# Patient Record
Sex: FEMALE | Race: WHITE | NOT HISPANIC OR LATINO | Employment: OTHER | URBAN - METROPOLITAN AREA
[De-identification: names, ages, dates, MRNs, and addresses within clinical notes are randomized per-mention and may not be internally consistent; named-entity substitution may affect disease eponyms.]

---

## 2022-08-10 ENCOUNTER — APPOINTMENT (OUTPATIENT)
Dept: CT IMAGING | Facility: HOSPITAL | Age: 87
End: 2022-08-10

## 2022-08-10 ENCOUNTER — HOSPITAL ENCOUNTER (EMERGENCY)
Facility: HOSPITAL | Age: 87
Discharge: HOME | End: 2022-08-10
Attending: EMERGENCY MEDICINE | Admitting: EMERGENCY MEDICINE

## 2022-08-10 VITALS
SYSTOLIC BLOOD PRESSURE: 153 MMHG | WEIGHT: 132.5 LBS | DIASTOLIC BLOOD PRESSURE: 69 MMHG | TEMPERATURE: 98.1 F | OXYGEN SATURATION: 93 % | HEART RATE: 106 BPM | BODY MASS INDEX: 22.62 KG/M2 | HEIGHT: 64 IN | RESPIRATION RATE: 18 BRPM

## 2022-08-10 DIAGNOSIS — N30.90 CYSTITIS: ICD-10-CM

## 2022-08-10 DIAGNOSIS — R33.9 URINARY RETENTION: Primary | ICD-10-CM

## 2022-08-10 LAB
ALBUMIN SERPL-MCNC: 4.8 G/DL (ref 3.5–5.2)
ALBUMIN/GLOB SERPL: 1.6 G/DL
ALP SERPL-CCNC: 79 U/L (ref 39–117)
ALT SERPL W P-5'-P-CCNC: 16 U/L (ref 1–33)
ANION GAP SERPL CALCULATED.3IONS-SCNC: 13.7 MMOL/L (ref 5–15)
AST SERPL-CCNC: 16 U/L (ref 1–32)
BACTERIA UR QL AUTO: ABNORMAL /HPF
BASOPHILS # BLD AUTO: 0.04 10*3/MM3 (ref 0–0.2)
BASOPHILS NFR BLD AUTO: 0.7 % (ref 0–1.5)
BILIRUB SERPL-MCNC: 0.4 MG/DL (ref 0–1.2)
BILIRUB UR QL STRIP: NEGATIVE
BUN SERPL-MCNC: 18 MG/DL (ref 8–23)
BUN/CREAT SERPL: 20.2 (ref 7–25)
CALCIUM SPEC-SCNC: 10.1 MG/DL (ref 8.2–9.6)
CHLORIDE SERPL-SCNC: 98 MMOL/L (ref 98–107)
CLARITY UR: CLEAR
CO2 SERPL-SCNC: 22.3 MMOL/L (ref 22–29)
COLOR UR: YELLOW
CREAT SERPL-MCNC: 0.89 MG/DL (ref 0.57–1)
D-LACTATE SERPL-SCNC: 1.3 MMOL/L (ref 0.5–2)
DEPRECATED RDW RBC AUTO: 45.4 FL (ref 37–54)
EGFRCR SERPLBLD CKD-EPI 2021: 59 ML/MIN/1.73
EOSINOPHIL # BLD AUTO: 0.18 10*3/MM3 (ref 0–0.4)
EOSINOPHIL NFR BLD AUTO: 2.9 % (ref 0.3–6.2)
ERYTHROCYTE [DISTWIDTH] IN BLOOD BY AUTOMATED COUNT: 14 % (ref 12.3–15.4)
GLOBULIN UR ELPH-MCNC: 3 GM/DL
GLUCOSE SERPL-MCNC: 240 MG/DL (ref 65–99)
GLUCOSE UR STRIP-MCNC: ABNORMAL MG/DL
HCT VFR BLD AUTO: 41.3 % (ref 34–46.6)
HGB BLD-MCNC: 13.5 G/DL (ref 12–15.9)
HGB UR QL STRIP.AUTO: ABNORMAL
HOLD SPECIMEN: NORMAL
HOLD SPECIMEN: NORMAL
HYALINE CASTS UR QL AUTO: ABNORMAL /LPF
IMM GRANULOCYTES # BLD AUTO: 0.06 10*3/MM3 (ref 0–0.05)
IMM GRANULOCYTES NFR BLD AUTO: 1 % (ref 0–0.5)
KETONES UR QL STRIP: ABNORMAL
LEUKOCYTE ESTERASE UR QL STRIP.AUTO: ABNORMAL
LIPASE SERPL-CCNC: 36 U/L (ref 13–60)
LYMPHOCYTES # BLD AUTO: 2.51 10*3/MM3 (ref 0.7–3.1)
LYMPHOCYTES NFR BLD AUTO: 41 % (ref 19.6–45.3)
MCH RBC QN AUTO: 29.2 PG (ref 26.6–33)
MCHC RBC AUTO-ENTMCNC: 32.7 G/DL (ref 31.5–35.7)
MCV RBC AUTO: 89.2 FL (ref 79–97)
MONOCYTES # BLD AUTO: 0.46 10*3/MM3 (ref 0.1–0.9)
MONOCYTES NFR BLD AUTO: 7.5 % (ref 5–12)
NEUTROPHILS NFR BLD AUTO: 2.87 10*3/MM3 (ref 1.7–7)
NEUTROPHILS NFR BLD AUTO: 46.9 % (ref 42.7–76)
NITRITE UR QL STRIP: NEGATIVE
NRBC BLD AUTO-RTO: 0 /100 WBC (ref 0–0.2)
PH UR STRIP.AUTO: 7 [PH] (ref 5–8)
PLATELET # BLD AUTO: 252 10*3/MM3 (ref 140–450)
PMV BLD AUTO: 8 FL (ref 6–12)
POTASSIUM SERPL-SCNC: 4.5 MMOL/L (ref 3.5–5.2)
PROT SERPL-MCNC: 7.8 G/DL (ref 6–8.5)
PROT UR QL STRIP: ABNORMAL
RBC # BLD AUTO: 4.63 10*6/MM3 (ref 3.77–5.28)
RBC # UR STRIP: ABNORMAL /HPF
REF LAB TEST METHOD: ABNORMAL
SODIUM SERPL-SCNC: 134 MMOL/L (ref 136–145)
SP GR UR STRIP: 1.01 (ref 1–1.03)
SQUAMOUS #/AREA URNS HPF: ABNORMAL /HPF
UROBILINOGEN UR QL STRIP: ABNORMAL
WBC # UR STRIP: ABNORMAL /HPF
WBC NRBC COR # BLD: 6.12 10*3/MM3 (ref 3.4–10.8)
WHOLE BLOOD HOLD COAG: NORMAL
WHOLE BLOOD HOLD SPECIMEN: NORMAL

## 2022-08-10 PROCEDURE — 83690 ASSAY OF LIPASE: CPT | Performed by: EMERGENCY MEDICINE

## 2022-08-10 PROCEDURE — 96374 THER/PROPH/DIAG INJ IV PUSH: CPT

## 2022-08-10 PROCEDURE — 25010000002 ONDANSETRON PER 1 MG: Performed by: EMERGENCY MEDICINE

## 2022-08-10 PROCEDURE — 99283 EMERGENCY DEPT VISIT LOW MDM: CPT

## 2022-08-10 PROCEDURE — 85025 COMPLETE CBC W/AUTO DIFF WBC: CPT | Performed by: EMERGENCY MEDICINE

## 2022-08-10 PROCEDURE — 81001 URINALYSIS AUTO W/SCOPE: CPT | Performed by: EMERGENCY MEDICINE

## 2022-08-10 PROCEDURE — 74177 CT ABD & PELVIS W/CONTRAST: CPT

## 2022-08-10 PROCEDURE — 0 IOPAMIDOL PER 1 ML: Performed by: EMERGENCY MEDICINE

## 2022-08-10 PROCEDURE — 51702 INSERT TEMP BLADDER CATH: CPT

## 2022-08-10 PROCEDURE — 36415 COLL VENOUS BLD VENIPUNCTURE: CPT

## 2022-08-10 PROCEDURE — 80053 COMPREHEN METABOLIC PANEL: CPT | Performed by: EMERGENCY MEDICINE

## 2022-08-10 PROCEDURE — 36415 COLL VENOUS BLD VENIPUNCTURE: CPT | Performed by: EMERGENCY MEDICINE

## 2022-08-10 PROCEDURE — 83605 ASSAY OF LACTIC ACID: CPT | Performed by: EMERGENCY MEDICINE

## 2022-08-10 PROCEDURE — 25010000002 MORPHINE PER 10 MG: Performed by: EMERGENCY MEDICINE

## 2022-08-10 PROCEDURE — 96375 TX/PRO/DX INJ NEW DRUG ADDON: CPT

## 2022-08-10 RX ORDER — LEVOTHYROXINE SODIUM 0.07 MG/1
75 TABLET ORAL DAILY
COMMUNITY

## 2022-08-10 RX ORDER — SODIUM CHLORIDE 0.9 % (FLUSH) 0.9 %
10 SYRINGE (ML) INJECTION AS NEEDED
Status: DISCONTINUED | OUTPATIENT
Start: 2022-08-10 | End: 2022-08-10 | Stop reason: HOSPADM

## 2022-08-10 RX ORDER — OMEPRAZOLE 40 MG/1
40 CAPSULE, DELAYED RELEASE ORAL DAILY
COMMUNITY

## 2022-08-10 RX ORDER — LOSARTAN POTASSIUM 25 MG/1
25 TABLET ORAL DAILY
COMMUNITY

## 2022-08-10 RX ORDER — PRAVASTATIN SODIUM 20 MG
20 TABLET ORAL DAILY
COMMUNITY

## 2022-08-10 RX ORDER — AMLODIPINE BESYLATE AND BENAZEPRIL HYDROCHLORIDE 2.5; 1 MG/1; MG/1
1 CAPSULE ORAL DAILY
COMMUNITY

## 2022-08-10 RX ORDER — NITROFURANTOIN MACROCRYSTALS 50 MG/1
50 CAPSULE ORAL 4 TIMES DAILY
COMMUNITY
End: 2022-08-10

## 2022-08-10 RX ORDER — ONDANSETRON 2 MG/ML
4 INJECTION INTRAMUSCULAR; INTRAVENOUS ONCE
Status: COMPLETED | OUTPATIENT
Start: 2022-08-10 | End: 2022-08-10

## 2022-08-10 RX ORDER — LANOLIN ALCOHOL/MO/W.PET/CERES
1000 CREAM (GRAM) TOPICAL DAILY
COMMUNITY

## 2022-08-10 RX ORDER — CEFDINIR 300 MG/1
300 CAPSULE ORAL 2 TIMES DAILY
COMMUNITY
End: 2022-08-17

## 2022-08-10 RX ORDER — MORPHINE SULFATE 2 MG/ML
2 INJECTION, SOLUTION INTRAMUSCULAR; INTRAVENOUS ONCE
Status: COMPLETED | OUTPATIENT
Start: 2022-08-10 | End: 2022-08-10

## 2022-08-10 RX ADMIN — MORPHINE SULFATE 2 MG: 2 INJECTION, SOLUTION INTRAMUSCULAR; INTRAVENOUS at 11:17

## 2022-08-10 RX ADMIN — IOPAMIDOL 100 ML: 755 INJECTION, SOLUTION INTRAVENOUS at 11:01

## 2022-08-10 RX ADMIN — ONDANSETRON 4 MG: 2 INJECTION INTRAMUSCULAR; INTRAVENOUS at 11:17

## 2022-08-10 RX ADMIN — SODIUM CHLORIDE 1000 ML: 9 INJECTION, SOLUTION INTRAVENOUS at 11:16

## 2022-08-10 NOTE — ED PROVIDER NOTES
"Time: 10:25 AM EDT  Arrived by: ambulance  Chief Complaint: abdominal pain  History provided by: Pt  History is limited by: N/A     History of Present Illness:  Patient is a 89 y.o.  female that presents to the emergency department via EMS with lower abdominal pain for the past 4 weeks. However this morning Pt states that her abdominal pain became severe, 10/10 in severity when she tried to urinate. Pt states that she has been treated for 4 weeks with antibiotics for new UTI. Family also states that she recently was diagnosed with cancer after a colonoscopy. Pt states that her bladder feels full and she could not urinate this morning. She complained of dysuria during the past few days. She currently rates the pain as \"100/10.\"     [facility]    Patient Care Team  Primary Care Provider: Provider, No Known    Past Medical History:     Allergies   Allergen Reactions   • Penicillins Unknown - Low Severity     Past Medical History:   Diagnosis Date   • Cancer (HCC)    • Diabetes mellitus (HCC)    • Hypertension      Past Surgical History:   Procedure Laterality Date   • REPLACEMENT TOTAL KNEE       History reviewed. No pertinent family history.    Home Medications:  Prior to Admission medications    Not on File        Social History:   Social History     Substance Use Topics   • Alcohol use: Never       Review of Systems:  Review of Systems   Constitutional: Negative for chills and fever.   HENT: Negative for sore throat.    Eyes: Negative for photophobia.   Respiratory: Negative for shortness of breath.    Cardiovascular: Negative for chest pain.   Gastrointestinal: Positive for abdominal pain (lower). Negative for diarrhea, nausea and vomiting.   Genitourinary: Positive for difficulty urinating and dysuria.        Unable to urinate   Musculoskeletal: Negative for neck pain.   Skin: Negative for wound.   Neurological: Negative for headaches.   All other systems reviewed and are negative.         Physical Exam:  /69   " "Pulse 114   Temp 98.1 °F (36.7 °C) (Oral)   Resp 18   Ht 162.6 cm (64\")   Wt 60.1 kg (132 lb 7.9 oz)   SpO2 94%   BMI 22.74 kg/m²     Physical Exam  Vitals and nursing note reviewed.   Constitutional:       General: She is not in acute distress.  HENT:      Head: Normocephalic and atraumatic.   Eyes:      Extraocular Movements: Extraocular movements intact.   Cardiovascular:      Rate and Rhythm: Normal rate and regular rhythm.   Pulmonary:      Effort: Pulmonary effort is normal. No respiratory distress.      Breath sounds: Normal breath sounds.   Abdominal:      General: Abdomen is flat.      Palpations: Abdomen is soft.      Tenderness: There is no abdominal tenderness. There is no guarding or rebound.   Musculoskeletal:         General: Normal range of motion.      Cervical back: Normal range of motion and neck supple.   Skin:     General: Skin is warm and dry.      Capillary Refill: Capillary refill takes less than 2 seconds.      Findings: No rash.   Neurological:      Mental Status: She is alert and oriented to person, place, and time. Mental status is at baseline.                Medications in the Emergency Department:  Medications   sodium chloride 0.9 % flush 10 mL (has no administration in time range)   morphine injection 2 mg (2 mg Intravenous Given 8/10/22 1117)   ondansetron (ZOFRAN) injection 4 mg (4 mg Intravenous Given 8/10/22 1117)   sodium chloride 0.9 % bolus 1,000 mL (0 mL Intravenous Stopped 8/10/22 1348)   iopamidol (ISOVUE-370) 76 % injection 100 mL (100 mL Intravenous Given 8/10/22 1101)        Labs  Lab Results (last 24 hours)     Procedure Component Value Units Date/Time    CBC & Differential [[id number]]  (Abnormal) Collected: 08/10/22 0940    Specimen: Blood from Arm, Right Updated: 08/10/22 0950    Narrative:      The following orders were created for panel order CBC & Differential.  Procedure                               Abnormality         Status                     ---------    "                            -----------         ------                     CBC Auto Differential[[id number]]        Abnormal            Final result                 Please view results for these tests on the individual orders.    Comprehensive Metabolic Panel [[id number]]  (Abnormal) Collected: 08/10/22 0940    Specimen: Blood from Arm, Right Updated: 08/10/22 1029     Glucose 240 mg/dL      BUN 18 mg/dL      Creatinine 0.89 mg/dL      Sodium 134 mmol/L      Potassium 4.5 mmol/L      Chloride 98 mmol/L      CO2 22.3 mmol/L      Calcium 10.1 mg/dL      Total Protein 7.8 g/dL      Albumin 4.80 g/dL      ALT (SGPT) 16 U/L      AST (SGOT) 16 U/L      Alkaline Phosphatase 79 U/L      Total Bilirubin 0.4 mg/dL      Globulin 3.0 gm/dL      A/G Ratio 1.6 g/dL      BUN/Creatinine Ratio 20.2     Anion Gap 13.7 mmol/L      eGFR 59.0 mL/min/1.73      Comment: National Kidney Foundation and American Society of Nephrology (ASN) Task Force recommended calculation based on the Chronic Kidney Disease Epidemiology Collaboration (CKD-EPI) equation refit without adjustment for race.       Narrative:      GFR Normal >60  Chronic Kidney Disease <60  Kidney Failure <15      Lipase [[id number]]  (Normal) Collected: 08/10/22 0940    Specimen: Blood from Arm, Right Updated: 08/10/22 1029     Lipase 36 U/L     Lactic Acid, Plasma [[id number]]  (Normal) Collected: 08/10/22 0940    Specimen: Blood from Arm, Right Updated: 08/10/22 1025     Lactate 1.3 mmol/L     CBC Auto Differential [[id number]]  (Abnormal) Collected: 08/10/22 0940    Specimen: Blood from Arm, Right Updated: 08/10/22 0950     WBC 6.12 10*3/mm3      RBC 4.63 10*6/mm3      Hemoglobin 13.5 g/dL      Hematocrit 41.3 %      MCV 89.2 fL      MCH 29.2 pg      MCHC 32.7 g/dL      RDW 14.0 %      RDW-SD 45.4 fl      MPV 8.0 fL      Platelets 252 10*3/mm3      Neutrophil % 46.9 %      Lymphocyte % 41.0 %      Monocyte % 7.5 %      Eosinophil % 2.9 %      Basophil % 0.7 %      Immature Grans %  1.0 %      Neutrophils, Absolute 2.87 10*3/mm3      Lymphocytes, Absolute 2.51 10*3/mm3      Monocytes, Absolute 0.46 10*3/mm3      Eosinophils, Absolute 0.18 10*3/mm3      Basophils, Absolute 0.04 10*3/mm3      Immature Grans, Absolute 0.06 10*3/mm3      nRBC 0.0 /100 WBC     Urinalysis With Microscopic If Indicated (No Culture) - Urine, Clean Catch [[id number]]  (Abnormal) Collected: 08/10/22 1148    Specimen: Urine, Clean Catch Updated: 08/10/22 1239     Color, UA Yellow     Appearance, UA Clear     pH, UA 7.0     Specific Gravity, UA 1.010     Glucose,  mg/dL (1+)     Ketones, UA Trace     Bilirubin, UA Negative     Blood, UA Small (1+)     Protein, UA 30 mg/dL (1+)     Leuk Esterase, UA Trace     Nitrite, UA Negative     Urobilinogen, UA 0.2 E.U./dL    Urinalysis, Microscopic Only - Urine, Clean Catch [[id number]]  (Abnormal) Collected: 08/10/22 1148    Specimen: Urine, Clean Catch Updated: 08/10/22 1239     RBC, UA 21-30 /HPF      WBC, UA 13-20 /HPF      Bacteria, UA None Seen /HPF      Squamous Epithelial Cells, UA 0-2 /HPF      Hyaline Casts, UA 0-2 /LPF      Methodology Automated Microscopy           Imaging:  CT Abdomen Pelvis With Contrast    Result Date: 8/10/2022  PROCEDURE: CT ABDOMEN PELVIS W CONTRAST  COMPARISON: None  INDICATIONS: abdominal pain, lower abdominal pelvic pain, blood in stool  PROTOCOL:   Standard imaging protocol performed    RADIATION:   DLP: 583.2mGy*cm   Automated exposure control was utilized to minimize radiation dose. CONTRAST: 75cc Isovue 370 I.V.  TECHNIQUE: Axial images of the abdomen and pelvis with intravenous contrast.  ABDOMEN:  There is a large hiatal hernia.  There is a calcified granuloma in the right lower lobe.  Tiny layering stones are present in the gallbladder.  There are no adjacent inflammatory changes.  The liver, spleen, pancreas and adrenal glands are normal.  There are a few small renal cysts.  No evidence of solid renal mass.  There is bilateral  pelvicaliectasis.   PELVIS:  No evidence of bowel obstruction, perforation or abscess.  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  The appendix and terminal ileum are normal.  There is mild colonic diverticulosis.  No CT evidence of colitis or diverticulitis.  The urinary bladder is distended with mild adjacent stranding.  There are old fractures of the left pubic rami.  L5 pars defects are present with 1 cm of anterolisthesis.  The abdominal aorta has a normal caliber.  Atherosclerotic disease is present.  There are small fat containing inguinal hernias.  There is a surgical suture line in the distal colon.  IMPRESSION:   1. Distended urinary bladder with mild adjacent stranding suggesting cystitis.  There is mild pelvicaliectasis.  2. Cholelithiasis.  3. Large hiatal hernia.  4. Mild colonic diverticulosis.  BHARGAVI WHEAT MD       Electronically Signed and Approved By: BHARGAVI WHEAT MD on 8/10/2022 at 11:23                EKG:      Procedures:  Procedures    Progress                            Medical Decision Making:  MDM   89-year-old female patient presents with complaint of difficulty urinating and pain with urination.  Patient's daughter states patient has been on antibiotics over the last 4 weeks for a urinary tract infection.  She is currently on cefdinir.  The patient is complaining of severe burning pain with urination.  Patient had a postvoid residual of over 600 mL on bladder scan.  Patient was discussed with Dr. Romo, urology, who recommended placing a Moreno catheter.  CT scan was done due to the patient's level of pain and past history of metastatic colon cancer.  CT shows evidence for acute cystitis.  No other significant acute abnormalities were seen.  Patient's pain was relieved with pain medication.  There is no evidence for acute urinary tract infection on urinalysis.  Moreno catheter was placed and patient will follow-up with Dr. Rosenberg.        Final diagnoses:   Urinary retention    Cystitis        Disposition:  ED Disposition     ED Disposition   Discharge    Condition   Stable    Comment   --                Kenny Martínez  08/10/22 1034       Kenny Martínez  08/10/22 1047       Stanley Rain DO  08/10/22 1420

## 2022-08-10 NOTE — DISCHARGE INSTRUCTIONS
Obtain a sooner appointment with the urologist in [place] or contact Dr. Romo's urology office for a sooner appointment if possible.

## 2022-08-12 ENCOUNTER — TELEPHONE (OUTPATIENT)
Dept: UROLOGY | Facility: CLINIC | Age: 87
End: 2022-08-12

## 2022-08-12 NOTE — TELEPHONE ENCOUNTER
Patient gave verbal permission to speak to Manda Cuenca, and I spoke her.    [facility] ER on 08/10/22.  Patient has lamb catheter discharge papers say to schedule appointment to f/u with Dr. Romo in one week, which would be 08/17/22.      Please call Manda Cuenca at [phone].

## 2022-08-17 ENCOUNTER — OFFICE VISIT (OUTPATIENT)
Dept: UROLOGY | Facility: CLINIC | Age: 87
End: 2022-08-17

## 2022-08-17 VITALS — BODY MASS INDEX: 22.4 KG/M2 | HEIGHT: 64 IN | WEIGHT: 131.2 LBS

## 2022-08-17 DIAGNOSIS — R33.9 RETENTION OF URINE: Primary | ICD-10-CM

## 2022-08-17 PROCEDURE — 99203 OFFICE O/P NEW LOW 30 MIN: CPT | Performed by: UROLOGY

## 2022-08-17 RX ORDER — AMLODIPINE BESYLATE 5 MG/1
5 TABLET ORAL DAILY
COMMUNITY
Start: 2022-07-27

## 2022-08-17 RX ORDER — GLIPIZIDE AND METFORMIN HCL 5; 500 MG/1; MG/1
TABLET, FILM COATED ORAL
COMMUNITY
Start: 2022-08-16

## 2022-08-17 RX ORDER — AMLODIPINE BESYLATE 2.5 MG/1
TABLET ORAL
COMMUNITY
Start: 2022-07-26

## 2022-08-17 NOTE — PROGRESS NOTES
UROLOGY OFFICE H&P NOTE    Subjective   HPI  Kera Valle is a 89 y.o. female. She presents today as a new patient to establish care after ER visit.  However, patient is established with urologist, Dr. Hyde, she has appointment with them next week.  She is accompanied by her granddaughter.    The patient was seen in the emergency room on 08/12/2022 noted to have significant retention for which catheter was inserted.  PVR bladder scan in ER greater than 600.  CT scan with very distended bladder, bilateral pelviectasis.   She reports that she is doing well with the catheter. The patient states that she feels better than when the catheter was placed.     Prior to ER presentation, the patient reports that she started going every few minutes overnight. She states that she felt like she was back to urinating all over herself. T  The patient reports that she does not have home health. She has an appointment with Dr. Hyde on 08/23/2022.     Her granddaughter is wondering that if they take the catheter out, what are the chances of something like this happening again.      Medical History:  Past Medical History:   Diagnosis Date   • Cancer (HCC)    • Diabetes mellitus (HCC)    • Hypertension         Social History:  Social History     Socioeconomic History   • Marital status:    Tobacco Use   • Smoking status: Never Smoker   • Smokeless tobacco: Never Used   Vaping Use   • Vaping Use: Never used   Substance and Sexual Activity   • Alcohol use: Never   • Drug use: Never        Family History:  History reviewed. No pertinent family history.     Surgical History:  Past Surgical History:   Procedure Laterality Date   • REPLACEMENT TOTAL KNEE          Allergies:  Allergies   Allergen Reactions   • Penicillins Unknown - Low Severity        Current Medications:  Current Outpatient Medications   Medication Sig Dispense Refill   • amLODIPine (NORVASC) 2.5 MG tablet      • amLODIPine (NORVASC) 5 MG tablet Take 5 mg by mouth  "Daily.     • amLODIPine-benazepril (LOTREL 2.5-10) 2.5-10 MG per capsule Take 1 capsule by mouth Daily.     • glipiZIDE-metFORMIN (METAGLIP) 5-500 MG per tablet      • levothyroxine (SYNTHROID, LEVOTHROID) 75 MCG tablet Take 75 mcg by mouth Daily.     • losartan (COZAAR) 25 MG tablet Take 25 mg by mouth Daily.     • omeprazole (priLOSEC) 40 MG capsule Take 40 mg by mouth Daily.     • pravastatin (PRAVACHOL) 20 MG tablet Take 20 mg by mouth Daily.     • vitamin B-12 (CYANOCOBALAMIN) 1000 MCG tablet Take 1,000 mcg by mouth Daily.     • metFORMIN (GLUCOPHAGE) 500 MG tablet Take 500 mg by mouth 2 (Two) Times a Day With Meals.       No current facility-administered medications for this visit.       Review of systems  A review of systems was performed, and positive findings are noted in the HPI.    Objective     Vital Signs:   Ht 162.6 cm (64\")   Wt 59.5 kg (131 lb 3.2 oz)   BMI 22.52 kg/m²       Physical exam  No acute distress, well-nourished  Awake alert and oriented  Mood normal; affect normal  Catheter in place draining clear    Results  PROCEDURE:  CT ABDOMEN PELVIS W CONTRAST     COMPARISON: None     INDICATIONS:  abdominal pain, lower abdominal pelvic pain, blood in stool     PROTOCOL:     Standard imaging protocol performed                 RADIATION:      DLP: 583.2mGy*cm               Automated exposure control was utilized to minimize radiation dose.   CONTRAST:      75cc Isovue 370 I.V.     TECHNIQUE: Axial images of the abdomen and pelvis with intravenous contrast.     ABDOMEN:  There is a large hiatal hernia.  There is a calcified granuloma in the right lower lobe.    Tiny layering stones are present in the gallbladder.  There are no adjacent inflammatory changes.    The liver, spleen, pancreas and adrenal glands are normal.  There are a few small renal cysts.  No   evidence of solid renal mass.  There is bilateral pelvicaliectasis.       PELVIS:  No evidence of bowel obstruction, perforation or abscess.  " "Calcified uterine leiomyomas   are present.  The appendix and terminal ileum are normal.  There is mild colonic diverticulosis.    No CT evidence of colitis or diverticulitis.  The urinary bladder is distended with mild adjacent   stranding.  There are old fractures of the left pubic rami.  L5 pars defects are present with 1 cm   of anterolisthesis.  The abdominal aorta has a normal caliber.  Atherosclerotic disease is present.    There are small fat containing inguinal hernias.  There is a surgical suture line in the distal   colon.     IMPRESSION:       1. Distended urinary bladder with mild adjacent stranding suggesting cystitis.  There is mild   pelvicaliectasis.     2. Cholelithiasis.     3. Large hiatal hernia.     4. Mild colonic diverticulosis.     BHARGAVI WHEAT MD         Electronically Signed and Approved By: BHARGAVI WHEAT MD on 8/10/2022 at 11:23            Problem List:  There is no problem list on file for this patient.      Assessment & Plan   Diagnoses and all orders for this visit:    1. Retention of urine (Primary)      ER records reviewed; urinary retention likely secondary to advanced age; uncertain if patient would pass void trial after bladder rest with catheter.    The patient has an appointment with Dr. Hyde next Tuesday, 08/23/2022.   Given risk of recurrence of retention requiring ER visit as being seen for afternoon appointment, recommend that the granddaughter will remove the catheter on Monday evening or Tuesday early morning in preparation for her appointment Tuesday.     CT scan imaging \" Power shared\" to [facility] so that Dr. Hyde may review.  Will defer further work-up and management to Dr. Hyde    Nursing staff provided catheter teaching, additional supplies    She is encouraged to follow-up as needed.   All questions and concerns have been addressed at this time.          Transcribed from ambient dictation for Abby Romo MD by OSWALD MIRANDA.  08/17/22 "   15:32 EDT    Patient verbalized consent to the visit recording.  I have personally performed the services described in this document as transcribed by the above individual, and it is both accurate and complete.  Abby Romo MD  8/21/2022  16:01 EDT

## 2025-07-24 NOTE — PROGRESS NOTES
Chief Complaint: recurrent uti    Subjective         History of Present Illness  Kera Valle is a 89 y.o. female presents to [facility] to be seen for follow-up.    Patient was previously seen by Dr. Abby Romo with last visit on 8/17/2022 for urinary retention.  She was then transferred to Dr. Hyde in [place].  She is here today for evaluation of recurrent UTIs    History of Present Illness  The patient is a 89-year-old female who presents for follow-up of recurrent urinary tract infections (UTIs).    She experiences frequent urination at night, needing to use the bathroom every 1 to 2 hours. During the day, she does not experience significant urgency or frequency. She reports no blood in her urine. She has no history of bladder or kidney surgeries. She has no heart or lung problems. She has been generally healthy throughout her life, with no significant illnesses until her knee replacement surgery and colon cancer diagnosis 4 years ago. She is currently using vaginal estrogen cream twice a week. She was previously prescribed nitrofurantoin but discontinued it due to increased urination. She is unsure if she has ever taken Keflex.      She takes medication for leg swelling as needed. Her daily routine includes sewing and making pot holders and place mats until 3:00 PM, after which she relaxes and watches television. She avoids drinking water after supper and takes her medications with a small amount of water before bed. She had sleep apnea years ago but reports no current sleep issues. She snores now.    PAST SURGICAL HISTORY:  She had colon cancer and underwent surgery for it 4 years ago. She opted not to take the therapy because it only had a 40% chance of helping her. She had her knees replaced.    FAMILY HISTORY  She does not have a family history of bladder or kidney cancers.            Urine culture  6/13/2025 25,000-50,000 Klebsiella pneumoniae, 25,000-50,000 E. coli,  25,000-50,000 mixed urogenital rome, multi resistant pattern  1/28/2025 greater than 100,000 E. coli resistant to ampicillin, ciprofloxacin, gentamicin, levofloxacin, tetracycline, Bactrim, intermediate to Augmentin, cefoxitin, otherwise sensitive      Previous 8/17/2022  The patient was seen in the emergency room on 08/12/2022 noted to have significant retention for which catheter was inserted.  PVR bladder scan in ER greater than 600.  CT scan with very distended bladder, bilateral pelviectasis.   She reports that she is doing well with the catheter. The patient states that she feels better than when the catheter was placed.      Prior to ER presentation, the patient reports that she started going every few minutes overnight. She states that she felt like she was back to urinating all over herself. T  The patient reports that she does not have home health. She has an appointment with Dr. Hyde on 08/23/2022.      Her granddaughter is wondering that if they take the catheter out, what are the chances of something like this happening again.          Objective     Past Medical History:   Diagnosis Date    Cancer     Diabetes mellitus     Hypertension        Past Surgical History:   Procedure Laterality Date    REPLACEMENT TOTAL KNEE           Current Outpatient Medications:     amLODIPine (NORVASC) 2.5 MG tablet, , Disp: , Rfl:     amLODIPine (NORVASC) 5 MG tablet, Take 1 tablet by mouth Daily., Disp: , Rfl:     aspirin 81 MG EC tablet, Take 1 tablet by mouth Daily., Disp: , Rfl:     ferrous sulfate 324 (65 Fe) MG tablet delayed-release EC tablet, Take 1 tablet by mouth Daily With Breakfast., Disp: , Rfl:     glipiZIDE-metFORMIN (METAGLIP) 5-500 MG per tablet, , Disp: , Rfl:     levothyroxine (SYNTHROID, LEVOTHROID) 75 MCG tablet, Take 1 tablet by mouth Daily., Disp: , Rfl:     losartan (COZAAR) 25 MG tablet, Take 1 tablet by mouth Daily., Disp: , Rfl:     metFORMIN (GLUCOPHAGE) 500 MG tablet, Take 1 tablet by mouth  "2 (Two) Times a Day With Meals., Disp: , Rfl:     omeprazole (priLOSEC) 40 MG capsule, Take 1 capsule by mouth Daily., Disp: , Rfl:     pravastatin (PRAVACHOL) 20 MG tablet, Take 1 tablet by mouth Daily., Disp: , Rfl:     vitamin B-12 (CYANOCOBALAMIN) 1000 MCG tablet, Take 1 tablet by mouth Daily., Disp: , Rfl:     amLODIPine-benazepril (LOTREL 2.5-10) 2.5-10 MG per capsule, Take 1 capsule by mouth Daily. (Patient not taking: Reported on 7/25/2025), Disp: , Rfl:     cephalexin (KEFLEX) 250 MG capsule, Take 1 capsule by mouth Daily., Disp: 90 capsule, Rfl: 4    Allergies   Allergen Reactions    Penicillins Unknown - Low Severity        History reviewed. No pertinent family history.    Social History     Socioeconomic History    Marital status:    Tobacco Use    Smoking status: Never     Passive exposure: Never    Smokeless tobacco: Never   Vaping Use    Vaping status: Never Used   Substance and Sexual Activity    Alcohol use: Never    Drug use: Never       Vital Signs:   Resp 14   Ht 162.6 cm (64\")   Wt 53.1 kg (117 lb)   BMI 20.08 kg/m²      Physical Exam  Vitals reviewed.   Constitutional:       Appearance: Normal appearance.   Neurological:      General: No focal deficit present.      Mental Status: She is alert and oriented to person, place, and time.   Psychiatric:         Mood and Affect: Mood normal.         Behavior: Behavior normal.          Result Review :   The following data was reviewed by: DMITRIY Fall on 07/25/2025:    Bladder Scan interpretation 07/25/2025    Estimation of residual urine via [employer] [facility] [facility] [place] Bladder Scan  MA/nurse performing: Jazmine SWEET MA  Residual Urine: 71 ml  Indication: Recurrent UTI    Nocturia   Position: Supine  Examination: Incremental scanning of the suprapubic area using 2.0 MHz transducer using copious amounts of acoustic gel.   Findings: An anechoic area was demonstrated which represented the bladder, with measurement of residual urine as " noted. I inspected this myself. In that the residual urine was stable or insignificant, refer to plan for treatment and plan necessary at this time.            Results        Procedures        Assessment and Plan    Diagnoses and all orders for this visit:    1. Recurrent UTI (Primary)  -     Bladder Scan  -     cephalexin (KEFLEX) 250 MG capsule; Take 1 capsule by mouth Daily.  Dispense: 90 capsule; Refill: 4    2. Nocturia        Assessment & Plan  1. Recurrent urinary tract infections (UTIs).  Nocturnal urination may be due to fluid accumulation in the legs during the day, which is then processed by the kidneys at night. The possibility of sleep apnea contributing to this was discussed, but the patient does not use a CPAP machine. A low-dose antibiotic, cephalexin, will be prescribed for daily use to prevent infections. The patient is advised to continue using vaginal estrogen cream twice weekly. If symptoms of a urinary tract infection arise, she should contact the office immediately. A urine culture will be ordered, and she can provide a sample at any [facility] lab. Treatment will be initiated based on the results of the culture. Risks of cephalexin include a very low risk of allergic reaction due to her penicillin allergy, which is limited to rash and not anaphylactic.    2. Leg swelling.  Medication for leg swelling is taken as needed. She is advised to elevate her feet for a few hours before bedtime and to limit fluid intake a couple of hours prior to sleep.     Follow-up  A follow-up appointment is scheduled for 10/2025.      Follow Up   Return in about 3 months (around 10/25/2025).  Patient was given instructions and counseling regarding her condition or for health maintenance advice. Please see specific information pulled into the AVS if appropriate.         This document has been electronically signed by DMITRIY Fall  July 25, 2025 09:55 EDT     Patient or patient representative verbalized  consent for the use of Ambient List[employer] during the visit with  DMITRIY Fall for chart documentation. 7/25/2025  09:55 EDT

## 2025-07-25 ENCOUNTER — OFFICE VISIT (OUTPATIENT)
Dept: UROLOGY | Age: OVER 89
End: 2025-07-25
Payer: MEDICARE

## 2025-07-25 VITALS — HEIGHT: 64 IN | BODY MASS INDEX: 19.97 KG/M2 | RESPIRATION RATE: 14 BRPM | WEIGHT: 117 LBS

## 2025-07-25 DIAGNOSIS — R35.1 NOCTURIA: ICD-10-CM

## 2025-07-25 DIAGNOSIS — N39.0 RECURRENT UTI: Primary | ICD-10-CM

## 2025-07-25 LAB — URINE VOLUME: 71

## 2025-07-25 RX ORDER — ASPIRIN 81 MG/1
81 TABLET ORAL DAILY
COMMUNITY

## 2025-07-25 RX ORDER — FERROUS SULFATE 324(65)MG
324 TABLET, DELAYED RELEASE (ENTERIC COATED) ORAL
COMMUNITY

## 2025-08-11 ENCOUNTER — TELEPHONE (OUTPATIENT)
Dept: UROLOGY | Age: OVER 89
End: 2025-08-11
Payer: MEDICARE

## 2025-08-11 DIAGNOSIS — R30.0 DYSURIA: Primary | ICD-10-CM

## 2025-08-15 ENCOUNTER — HOSPITAL ENCOUNTER (INPATIENT)
Facility: HOSPITAL | Age: OVER 89
LOS: 3 days | Discharge: NURSING, REHAB OR LONG-TERM CARE | End: 2025-08-19
Attending: EMERGENCY MEDICINE | Admitting: STUDENT IN AN ORGANIZED HEALTH CARE EDUCATION/TRAINING PROGRAM
Payer: MEDICARE

## 2025-08-15 ENCOUNTER — TELEPHONE (OUTPATIENT)
Dept: UROLOGY | Age: OVER 89
End: 2025-08-15
Payer: MEDICARE

## 2025-08-15 DIAGNOSIS — N39.0 ACUTE UTI: Primary | ICD-10-CM

## 2025-08-15 PROBLEM — E87.1 HYPONATREMIA: Status: ACTIVE | Noted: 2025-08-15

## 2025-08-15 RX ORDER — CIPROFLOXACIN 250 MG/1
250 TABLET, FILM COATED ORAL 2 TIMES DAILY
Qty: 10 TABLET | Refills: 0 | Status: SHIPPED | OUTPATIENT
Start: 2025-08-15 | End: 2025-08-15

## 2025-08-15 RX ORDER — CEFDINIR 300 MG/1
300 CAPSULE ORAL DAILY
Qty: 5 CAPSULE | Refills: 0 | Status: ON HOLD | OUTPATIENT
Start: 2025-08-15 | End: 2025-08-20

## 2025-08-18 ENCOUNTER — TELEPHONE (OUTPATIENT)
Dept: UROLOGY | Age: OVER 89
End: 2025-08-18
Payer: MEDICARE

## 2025-08-29 PROBLEM — E16.2 HYPOGLYCEMIA: Status: ACTIVE | Noted: 2025-08-29

## 2025-09-01 PROBLEM — R54 AGE-RELATED PHYSICAL DEBILITY: Status: ACTIVE | Noted: 2025-09-01
